# Patient Record
Sex: MALE | Race: WHITE
[De-identification: names, ages, dates, MRNs, and addresses within clinical notes are randomized per-mention and may not be internally consistent; named-entity substitution may affect disease eponyms.]

---

## 2019-06-18 DIAGNOSIS — Z84.81 FAMILY HISTORY OF GENETIC DISEASE CARRIER: Primary | ICD-10-CM

## 2019-06-18 PROCEDURE — 40000803 ZZHCL STATISTIC DNA ISOL HIGH PURITY: Performed by: PEDIATRICS

## 2019-06-18 PROCEDURE — 84999 UNLISTED CHEMISTRY PROCEDURE: CPT | Performed by: PEDIATRICS

## 2019-06-18 PROCEDURE — 81402 MOPATH PROCEDURE LEVEL 3: CPT | Performed by: PEDIATRICS

## 2019-06-18 PROCEDURE — 36415 COLL VENOUS BLD VENIPUNCTURE: CPT | Performed by: PEDIATRICS

## 2019-06-18 NOTE — PROGRESS NOTES
Jun 18, 2019    It was a pleasure meeting with Ja along with his daughter, Ursula, who has been diagnosed with Krabbe disease, in the [facility] on Jun 18, 2019 to discuss options for genetic testing for the family. 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 Parental testing can assist with confirming true homozygosity for this variant or can assist in identifying an alternative mechanism behind Ursula's apparently homozygous finding. We reviewed the risks, benefits, limitations, and potential for genetic discrimination from testing and we discussed the possibility of unintended findings, including maternity/paternity. We discussed drawing blood today to evaluate insurance for testing. We did review that there may be a fee associated with the blood draw. Ja expressed understanding and consented to testing. Ja does not have his insurance information with his today and plans to provide this information to me directly.    All questions answered. Additional concerns were denied.    Lora Moffett MS, [facility]  Certified Genetic Counselor  Pediatric Blood & Marrow Transplant  [phone]  [email or web address]

## 2019-06-24 DIAGNOSIS — Z84.81 FAMILY HISTORY OF CARRIER OF GENETIC DISEASE: Primary | ICD-10-CM

## 2019-06-24 LAB — COPATH REPORT: NORMAL

## 2019-06-27 LAB
LOCATION PERFORMED: NORMAL
RESULT: NORMAL
SEND OUTS MISC TEST CODE: NORMAL
SEND OUTS MISC TEST SPECIMEN: NORMAL
TEST NAME: NORMAL

## 2019-07-18 LAB
RESULT: NORMAL
SEND OUTS MISC TEST CODE: NORMAL
SEND OUTS MISC TEST SPECIMEN: NORMAL
TEST NAME: NORMAL

## 2020-03-11 ENCOUNTER — HEALTH MAINTENANCE LETTER (OUTPATIENT)
Age: 35
End: 2020-03-11

## 2021-01-03 ENCOUNTER — HEALTH MAINTENANCE LETTER (OUTPATIENT)
Age: 36
End: 2021-01-03

## 2021-04-25 ENCOUNTER — HEALTH MAINTENANCE LETTER (OUTPATIENT)
Age: 36
End: 2021-04-25

## 2021-10-10 ENCOUNTER — HEALTH MAINTENANCE LETTER (OUTPATIENT)
Age: 36
End: 2021-10-10

## 2022-05-21 ENCOUNTER — HEALTH MAINTENANCE LETTER (OUTPATIENT)
Age: 37
End: 2022-05-21

## 2022-09-18 ENCOUNTER — HEALTH MAINTENANCE LETTER (OUTPATIENT)
Age: 37
End: 2022-09-18

## 2023-06-04 ENCOUNTER — HEALTH MAINTENANCE LETTER (OUTPATIENT)
Age: 38
End: 2023-06-04